# Patient Record
Sex: FEMALE | Race: OTHER
[De-identification: names, ages, dates, MRNs, and addresses within clinical notes are randomized per-mention and may not be internally consistent; named-entity substitution may affect disease eponyms.]

---

## 2018-10-08 ENCOUNTER — HOSPITAL ENCOUNTER (EMERGENCY)
Dept: HOSPITAL 72 - EMR | Age: 23
Discharge: HOME | End: 2018-10-08
Payer: MEDICAID

## 2018-10-08 VITALS — DIASTOLIC BLOOD PRESSURE: 94 MMHG | SYSTOLIC BLOOD PRESSURE: 154 MMHG

## 2018-10-08 VITALS — SYSTOLIC BLOOD PRESSURE: 110 MMHG | DIASTOLIC BLOOD PRESSURE: 65 MMHG

## 2018-10-08 VITALS — BODY MASS INDEX: 24.45 KG/M2 | WEIGHT: 138 LBS | HEIGHT: 63 IN

## 2018-10-08 VITALS — SYSTOLIC BLOOD PRESSURE: 121 MMHG | DIASTOLIC BLOOD PRESSURE: 80 MMHG

## 2018-10-08 DIAGNOSIS — H81.10: Primary | ICD-10-CM

## 2018-10-08 LAB
ADD MANUAL DIFF: NO
ALBUMIN SERPL-MCNC: 4.2 G/DL (ref 3.4–5)
ALBUMIN/GLOB SERPL: 1 {RATIO} (ref 1–2.7)
ALP SERPL-CCNC: 69 U/L (ref 46–116)
ALT SERPL-CCNC: 27 U/L (ref 12–78)
APPEARANCE UR: CLEAR
APTT PPP: (no result) S
AST SERPL-CCNC: 15 U/L (ref 15–37)
BASOPHILS NFR BLD AUTO: 1.2 % (ref 0–2)
BILIRUB SERPL-MCNC: 0.2 MG/DL (ref 0.2–1)
BUN SERPL-MCNC: 10 MG/DL (ref 7–18)
CALCIUM SERPL-MCNC: 9.5 MG/DL (ref 8.5–10.1)
CHLORIDE SERPL-SCNC: 104 MMOL/L (ref 98–107)
CO2 SERPL-SCNC: 27 MMOL/L (ref 21–32)
CREAT SERPL-MCNC: 0.8 MG/DL (ref 0.55–1.3)
EOSINOPHIL NFR BLD AUTO: 1 % (ref 0–3)
ERYTHROCYTE [DISTWIDTH] IN BLOOD BY AUTOMATED COUNT: 14.7 % (ref 11.6–14.8)
GLOBULIN SER-MCNC: 4 G/DL
GLUCOSE UR STRIP-MCNC: NEGATIVE MG/DL
HCT VFR BLD CALC: 39.9 % (ref 37–47)
HGB BLD-MCNC: 13.3 G/DL (ref 12–16)
KETONES UR QL STRIP: NEGATIVE
LEUKOCYTE ESTERASE UR QL STRIP: NEGATIVE
LYMPHOCYTES NFR BLD AUTO: 29.4 % (ref 20–45)
MCV RBC AUTO: 81 FL (ref 80–99)
MONOCYTES NFR BLD AUTO: 6.1 % (ref 1–10)
NEUTROPHILS NFR BLD AUTO: 62.5 % (ref 45–75)
NITRITE UR QL STRIP: NEGATIVE
PH UR STRIP: 6.5 [PH] (ref 4.5–8)
PLATELET # BLD: 269 K/UL (ref 150–450)
POTASSIUM SERPL-SCNC: 3.7 MMOL/L (ref 3.5–5.1)
PROT UR QL STRIP: NEGATIVE
RBC # BLD AUTO: 4.93 M/UL (ref 4.2–5.4)
SODIUM SERPL-SCNC: 140 MMOL/L (ref 136–145)
SP GR UR STRIP: 1.01 (ref 1–1.03)
UROBILINOGEN UR-MCNC: NORMAL MG/DL (ref 0–1)
WBC # BLD AUTO: 11.1 K/UL (ref 4.8–10.8)

## 2018-10-08 PROCEDURE — 80053 COMPREHEN METABOLIC PANEL: CPT

## 2018-10-08 PROCEDURE — 81025 URINE PREGNANCY TEST: CPT

## 2018-10-08 PROCEDURE — 85025 COMPLETE CBC W/AUTO DIFF WBC: CPT

## 2018-10-08 PROCEDURE — 99284 EMERGENCY DEPT VISIT MOD MDM: CPT

## 2018-10-08 PROCEDURE — 81003 URINALYSIS AUTO W/O SCOPE: CPT

## 2018-10-08 PROCEDURE — 36415 COLL VENOUS BLD VENIPUNCTURE: CPT

## 2018-10-08 PROCEDURE — 93005 ELECTROCARDIOGRAM TRACING: CPT

## 2018-10-08 PROCEDURE — 96361 HYDRATE IV INFUSION ADD-ON: CPT

## 2018-10-08 PROCEDURE — 80307 DRUG TEST PRSMV CHEM ANLYZR: CPT

## 2018-10-08 PROCEDURE — 96374 THER/PROPH/DIAG INJ IV PUSH: CPT

## 2018-10-09 NOTE — EMERGENCY ROOM REPORT
History of Present Illness


General


Chief Complaint:  Dizziness


Source:  Patient





Present Illness


HPI


23-year-old female presents ED for evaluation.  Complaining of dizziness which 

started tonight.  Described as room spinning sensation, worse with sudden head 

movement.  Denies headache.  Denies nausea or vomiting.  Patient states that 

there is a family history of vertigo.  Denies alcohol or drug use.  No other 

aggravating or relieving factors.  Denies any other associated symptoms


Allergies:  


Coded Allergies:  


     No Known Allergies (Unverified , 10/8/18)





Patient History


Past Medical History:  none


Past Surgical History:  none


Pertinent Family History:  none


Social History:  Denies: smoking, alcohol use, drug use


Last Menstrual Period:  Sept 17, 2018


Pregnant Now:  No


Immunizations:  UTD


Reviewed Nursing Documentation:  PMH: Agreed; PSxH: Agreed





Nursing Documentation-PMH


Past Medical History:  No History, Except For





Review of Systems


All Other Systems:  negative except mentioned in HPI





Physical Exam





Vital Signs








  Date Time  Temp Pulse Resp B/P (MAP) Pulse Ox O2 Delivery O2 Flow Rate FiO2


 


10/8/18 21:08 98.4 99 16 154/94 100 Room Air  





 98.4       


 


10/8/18 23:26        99








Sp02 EP Interpretation:  reviewed, normal


General Appearance:  no apparent distress, alert, GCS 15, non-toxic


Head:  normocephalic, atraumatic


Eyes:  bilateral eye normal inspection, bilateral eye PERRL


ENT:  hearing grossly normal, normal pharynx, no angioedema, normal voice


Neck:  full range of motion, supple/symm/no masses


Respiratory:  chest non-tender, lungs clear, normal breath sounds, speaking 

full sentences


Cardiovascular #1:  regular rate, rhythm, no edema


Cardiovascular #2:  2+ carotid (R), 2+ carotid (L), 2+ radial (R), 2+ radial (L)

, 2+ dorsalis pedis (R), 2+ dorsalis pedis (L)


Gastrointestinal:  normal bowel sounds, non tender, soft, non-distended, no 

guarding, no rebound


Rectal:  deferred


Genitourinary:  normal inspection, no CVA tenderness


Musculoskeletal:  back normal, gait/station normal, normal range of motion, non-

tender


Neurologic:  alert, oriented x3, responsive, motor strength/tone normal, 

sensory intact, speech normal


Psychiatric:  judgement/insight normal, memory normal, mood/affect normal, no 

suicidal/homicidal ideation


Reflexes:  3+ bicep (R), 3+ bicep (L), 3+ tricep (R), 3+ tricep (L), 3+ knee (R)

, 3+ knee (L)


Skin:  normal color, no rash, warm/dry, well hydrated


Lymphatic:  no adenopathy





Medical Decision Making


Diagnostic Impression:  


 Primary Impression:  


 Benign positional vertigo


 Qualified Codes:  H81.10 - Benign paroxysmal vertigo, unspecified ear


ER Course


Hospital Course 


23-year-old female presents ED complaining of room spinning sensation 





Differential diagnoses include: arrythmia, dehydration, vertigo, UTI, substance 

abuse 





Clinical course


Patient placed on stretcher. on cardiac monitor. After initial history and 

physical I ordered labs, EKG, IVFs, meclizine, reglan 





labs reviewed- no leukocytosis, hemoglobin/hematocrit stable, electrolytes okay

, UA negative, UDS negative


EKG - sinus tachycardia, no acute ischemic changes interpreted by me 





Upon reassessment patient states his symptoms have improved.  Clinical findings 

consistent with vertigo.  Discussed findings with patient, family.  Safe for 

discharge with close outpatient follow-up





I.  I feel this is a highly complex case requiring extensive working including 

EKG/Rhythm strip, Xray/CT/US, Blood/urine lab work, repeat exams while in ED, 

and administration of strong opiates/narcotics for pain control, admission to 

hospital or close patient follow up.  





Diagnosis - benign positional vertigo 





stable and discharged to home with prescription for meclizine, reglan.  

Followup with PMD.  Return to ED if symptoms recur or worsen





Labs








Test


  10/8/18


22:15


 


White Blood Count


  11.1 K/UL


(4.8-10.8)


 


Red Blood Count


  4.93 M/UL


(4.20-5.40)


 


Hemoglobin


  13.3 G/DL


(12.0-16.0)


 


Hematocrit


  39.9 %


(37.0-47.0)


 


Mean Corpuscular Volume 81 FL (80-99) 


 


Mean Corpuscular Hemoglobin


  26.9 PG


(27.0-31.0)


 


Mean Corpuscular Hemoglobin


Concent 33.2 G/DL


(32.0-36.0)


 


Red Cell Distribution Width


  14.7 %


(11.6-14.8)


 


Platelet Count


  269 K/UL


(150-450)


 


Mean Platelet Volume


  9.4 FL


(6.5-10.1)


 


Neutrophils (%) (Auto)


  62.5 %


(45.0-75.0)


 


Lymphocytes (%) (Auto)


  29.4 %


(20.0-45.0)


 


Monocytes (%) (Auto)


  6.1 %


(1.0-10.0)


 


Eosinophils (%) (Auto)


  1.0 %


(0.0-3.0)


 


Basophils (%) (Auto)


  1.2 %


(0.0-2.0)


 


Urine Color Pale yellow 


 


Urine Appearance Clear 


 


Urine pH 6.5 (4.5-8.0) 


 


Urine Specific Gravity


  1.010


(1.005-1.035)


 


Urine Protein


  Negative


(NEGATIVE)


 


Urine Glucose (UA)


  Negative


(NEGATIVE)


 


Urine Ketones


  Negative


(NEGATIVE)


 


Urine Blood


  Negative


(NEGATIVE)


 


Urine Nitrite


  Negative


(NEGATIVE)


 


Urine Bilirubin


  Negative


(NEGATIVE)


 


Urine Urobilinogen


  Normal MG/DL


(0.0-1.0)


 


Urine Leukocyte Esterase


  Negative


(NEGATIVE)


 


Urine HCG, Qualitative


  Negative


(NEGATIVE)


 


Sodium Level


  140 MMOL/L


(136-145)


 


Potassium Level


  3.7 MMOL/L


(3.5-5.1)


 


Chloride Level


  104 MMOL/L


()


 


Carbon Dioxide Level


  27 MMOL/L


(21-32)


 


Blood Urea Nitrogen


  10 mg/dL


(7-18)


 


Creatinine


  0.8 MG/DL


(0.55-1.30)


 


Estimat Glomerular Filtration


Rate > 60 mL/min


(>60)


 


Glucose Level


  116 MG/DL


()


 


Calcium Level


  9.5 MG/DL


(8.5-10.1)


 


Total Bilirubin


  0.2 MG/DL


(0.2-1.0)


 


Aspartate Amino Transf


(AST/SGOT) 15 U/L (15-37) 


 


 


Alanine Aminotransferase


(ALT/SGPT) 27 U/L (12-78) 


 


 


Alkaline Phosphatase


  69 U/L


()


 


Total Protein


  8.2 G/DL


(6.4-8.2)


 


Albumin


  4.2 G/DL


(3.4-5.0)


 


Globulin 4.0 g/dL 


 


Albumin/Globulin Ratio 1.0 (1.0-2.7) 


 


Urine Opiates Screen


  Negative


(NEGATIVE)


 


Urine Barbiturates Screen


  Negative


(NEGATIVE)


 


Phencyclidine (PCP) Screen


  Negative


(NEGATIVE)


 


Urine Amphetamines Screen


  Negative


(NEGATIVE)


 


Urine Benzodiazepines Screen


  Negative


(NEGATIVE)


 


Urine Cocaine Screen


  Negative


(NEGATIVE)


 


Urine Marijuana (THC) Screen


  Negative


(NEGATIVE)








EKG Diagnostic Results


Rate:  tachycardiac


Rhythm:  NSR


ST Segments:  no acute changes


ASA given to the pt in ED:  No





Rhythm Strip Diag. Results


EP Interpretation:  yes


Rhythm:  NSR, no PVC's, no ectopy





Last Vital Signs








  Date Time  Temp Pulse Resp B/P (MAP) Pulse Ox O2 Delivery O2 Flow Rate FiO2


 


10/8/18 23:26 97.8 89 16 121/80 99 Room Air  99








Status:  improved


Disposition:  HOME, SELF-CARE


Condition:  Stable


Scripts


Metoclopramide Hcl* (REGLAN*) 10 Mg Tablet


10 MG ORAL THREE TIMES A DAY, #20 TAB


   Prov: Lalo Sharp MD         10/8/18 


Meclizine Hcl* (MECLIZINE*) 25 Mg Tablet


25 MG ORAL THREE TIMES A DAY, #20 TAB


   Prov: Lalo Sharp MD         10/8/18


Patient Instructions:  Benign Positional Vertigo











Lalo Sharp MD Oct 9, 2018 00:08

## 2018-10-09 NOTE — CARDIOLOGY REPORT
--------------- APPROVED REPORT --------------





EKG Measurement

Heart [id number]

SD [id number]

[id number]

[id number]

[id number]





Sinus tachycardia

Possible Left atrial enlargement

Borderline ECG

## 2019-09-26 ENCOUNTER — HOSPITAL ENCOUNTER (EMERGENCY)
Dept: HOSPITAL 72 - EMR | Age: 24
Discharge: HOME | End: 2019-09-26
Payer: MEDICAID

## 2019-09-26 VITALS — SYSTOLIC BLOOD PRESSURE: 137 MMHG | DIASTOLIC BLOOD PRESSURE: 91 MMHG

## 2019-09-26 VITALS — HEIGHT: 63 IN | WEIGHT: 155 LBS | BODY MASS INDEX: 27.46 KG/M2

## 2019-09-26 VITALS — DIASTOLIC BLOOD PRESSURE: 87 MMHG | SYSTOLIC BLOOD PRESSURE: 131 MMHG

## 2019-09-26 VITALS — DIASTOLIC BLOOD PRESSURE: 82 MMHG | SYSTOLIC BLOOD PRESSURE: 136 MMHG

## 2019-09-26 DIAGNOSIS — F41.9: Primary | ICD-10-CM

## 2019-09-26 DIAGNOSIS — R00.2: ICD-10-CM

## 2019-09-26 DIAGNOSIS — K21.9: ICD-10-CM

## 2019-09-26 DIAGNOSIS — N39.0: ICD-10-CM

## 2019-09-26 LAB
ADD MANUAL DIFF: NO
ALBUMIN SERPL-MCNC: 4.1 G/DL (ref 3.4–5)
ALBUMIN/GLOB SERPL: 1.1 {RATIO} (ref 1–2.7)
ALP SERPL-CCNC: 60 U/L (ref 46–116)
ALT SERPL-CCNC: 25 U/L (ref 12–78)
ANION GAP SERPL CALC-SCNC: 12 MMOL/L (ref 5–15)
APPEARANCE UR: CLEAR
APTT PPP: (no result) S
AST SERPL-CCNC: 20 U/L (ref 15–37)
BASOPHILS NFR BLD AUTO: 1.3 % (ref 0–2)
BILIRUB SERPL-MCNC: 0.3 MG/DL (ref 0.2–1)
BUN SERPL-MCNC: 10 MG/DL (ref 7–18)
CALCIUM SERPL-MCNC: 9.3 MG/DL (ref 8.5–10.1)
CHLORIDE SERPL-SCNC: 105 MMOL/L (ref 98–107)
CO2 SERPL-SCNC: 25 MMOL/L (ref 21–32)
CREAT SERPL-MCNC: 0.9 MG/DL (ref 0.55–1.3)
EOSINOPHIL NFR BLD AUTO: 0.8 % (ref 0–3)
ERYTHROCYTE [DISTWIDTH] IN BLOOD BY AUTOMATED COUNT: 13.5 % (ref 11.6–14.8)
GLOBULIN SER-MCNC: 3.8 G/DL
GLUCOSE UR STRIP-MCNC: NEGATIVE MG/DL
HCT VFR BLD CALC: 40.1 % (ref 37–47)
HGB BLD-MCNC: 13.1 G/DL (ref 12–16)
KETONES UR QL STRIP: (no result)
LEUKOCYTE ESTERASE UR QL STRIP: (no result)
LYMPHOCYTES NFR BLD AUTO: 27.8 % (ref 20–45)
MCV RBC AUTO: 82 FL (ref 80–99)
MONOCYTES NFR BLD AUTO: 6.2 % (ref 1–10)
NEUTROPHILS NFR BLD AUTO: 63.8 % (ref 45–75)
NITRITE UR QL STRIP: NEGATIVE
PH UR STRIP: 7 [PH] (ref 4.5–8)
PLATELET # BLD: 261 K/UL (ref 150–450)
POTASSIUM SERPL-SCNC: 3.7 MMOL/L (ref 3.5–5.1)
PROT UR QL STRIP: NEGATIVE
RBC # BLD AUTO: 4.89 M/UL (ref 4.2–5.4)
SODIUM SERPL-SCNC: 142 MMOL/L (ref 136–145)
SP GR UR STRIP: 1.01 (ref 1–1.03)
UROBILINOGEN UR-MCNC: NORMAL MG/DL (ref 0–1)
WBC # BLD AUTO: 9.8 K/UL (ref 4.8–10.8)

## 2019-09-26 PROCEDURE — 96374 THER/PROPH/DIAG INJ IV PUSH: CPT

## 2019-09-26 PROCEDURE — 99284 EMERGENCY DEPT VISIT MOD MDM: CPT

## 2019-09-26 PROCEDURE — 84484 ASSAY OF TROPONIN QUANT: CPT

## 2019-09-26 PROCEDURE — 81025 URINE PREGNANCY TEST: CPT

## 2019-09-26 PROCEDURE — 81001 URINALYSIS AUTO W/SCOPE: CPT

## 2019-09-26 PROCEDURE — 36415 COLL VENOUS BLD VENIPUNCTURE: CPT

## 2019-09-26 PROCEDURE — 80329 ANALGESICS NON-OPIOID 1 OR 2: CPT

## 2019-09-26 PROCEDURE — 71045 X-RAY EXAM CHEST 1 VIEW: CPT

## 2019-09-26 PROCEDURE — 93005 ELECTROCARDIOGRAM TRACING: CPT

## 2019-09-26 PROCEDURE — 96375 TX/PRO/DX INJ NEW DRUG ADDON: CPT

## 2019-09-26 PROCEDURE — 85025 COMPLETE CBC W/AUTO DIFF WBC: CPT

## 2019-09-26 PROCEDURE — 80053 COMPREHEN METABOLIC PANEL: CPT

## 2019-09-26 PROCEDURE — 80307 DRUG TEST PRSMV CHEM ANLYZR: CPT

## 2019-09-26 PROCEDURE — 96361 HYDRATE IV INFUSION ADD-ON: CPT

## 2019-09-26 NOTE — NUR
ED Nurse Note:



PT WALKED IN TO ER TODAY FROM HOME. AOX4. PT C/O PALPITATIONS AND FAST HEART 
RATE X 3 DAYS AGO. PT DENIES ANY MEDICAL HISTORY, DRUG OR ALCOHOL USE. PT 
STATES SHE HAS NOT DONE ANYTHING OUT OF THE ORDINARY AND DENIES ANY ANXIETY. PT 
DENIES CHEST PAIN OR SOB. AT BEDSIDE, PT IS CALM, COOPERATIVE, AND ANSWERING 
QUESTIONS APPROPRIATELY.  WITH /91. SINUS TACHYCARDIA ON MONITOR. 
DR LAI AWARE.

## 2019-09-26 NOTE — EMERGENCY ROOM REPORT
History of Present Illness


General


Chief Complaint:  Palpitations


Source:  Patient





Present Illness


HPI


24-year-old female with history of anxiety here complaining of 3 days of 

intermittent palpitations.  Patient reports that she was standing in line to 

forward her pain that her palpitations started.  However reports that she is 

very active denies calf swelling and tenderness.  Denies chest pain shortness 

of breath.  Denies abdominal pain nausea vomiting.  Reports that her 

palpitations get worse as she lays down and feels acidity coming up her throat.

  Patient reports that this past weekend she consumes a lot of spicy and acidic 

food as well as more than usual number of alcoholic beverages.  Denies smoking 

tobacco and drug use.  Denies dizziness and headache, vision changes, reports 

that she is beginning to get her usual migraine headache which Excedrin is 

helping.  Patient reports that she consumes 1 cup of coffee every day.  Denies 

urinary symptoms, hematuria, constipation diarrhea, blood in her stool.  Denies 

recent URI symptoms.  Patient reports that she recently traveled to [place] 

however denies sick contact reports that she has history of anxiety however 

denies any medication intake in the past.  Denies suicidal and homicidal 

ideation.  Reports that her sleep and appetite is good.  Denies visual and 

auditory hallucinations, mood swings, impulsivity, grandiosity, and other 

associated symptoms.LMP 3 wks ago, regular, denying sexual activity.


Allergies:  


Coded Allergies:  


     No Known Allergies (Unverified , 10/8/18)





Patient History


Past Medical History:  see triage record


Past Surgical History:  unable to obtain


Pertinent Family History:  none


Last Menstrual Period:  09/06/2019


Pregnant Now:  No


Immunizations:  UTD


Reviewed Nursing Documentation:  PMH: Agreed; PSxH: Agreed





Nursing Documentation-PM


Past Medical History:  No Stated History





Review of Systems


All Other Systems:  negative except mentioned in HPI





Physical Exam





Vital Signs








  Date Time  Temp Pulse Resp B/P (MAP) Pulse Ox O2 Delivery O2 Flow Rate FiO2


 


9/26/19 17:59 98.8 110 18 159/91 (113) 99 Room Air  








Sp02 EP Interpretation:  reviewed, normal


General Appearance:  no apparent distress, alert, GCS 15, non-toxic


Head:  normocephalic, atraumatic


Eyes:  bilateral eye normal inspection, bilateral eye PERRL


ENT:  hearing grossly normal, normal pharynx, no angioedema, normal voice


Neck:  full range of motion, supple/symm/no masses


Respiratory:  chest non-tender, lungs clear, normal breath sounds, no rhonchi, 

no accessory muscle use, no wheezing, speaking full sentences


Cardiovascular #1:  regular rate, rhythm, no edema, no murmur, normal capillary 

refill


Cardiovascular #2:  2+ carotid (R), 2+ carotid (L), 2+ radial (R), 2+ radial (L)

, 2+ dorsalis pedis (R), 2+ dorsalis pedis (L)


Gastrointestinal:  normal bowel sounds, non tender, soft, non-distended, no 

guarding, no rebound


Rectal:  deferred


Genitourinary:  normal inspection, no CVA tenderness


Musculoskeletal:  back normal, gait/station normal, normal range of motion, non-

tender, no calf tenderness


Neurologic:  alert, oriented x3, responsive, motor strength/tone normal, 

sensory intact, speech normal


Psychiatric:  judgement/insight normal, memory normal, mood/affect normal, no 

suicidal/homicidal ideation


Skin:  no rash


Lymphatic:  normal inspection, no adenopathy





Medical Decision Making


PA Attestation


All diagnoses and treatment plans were reviewed and discussed with my 

supervising physician Dr. Newman


Diagnostic Impression:  


 Primary Impression:  


 Palpitations


 Additional Impressions:  


 GERD (gastroesophageal reflux disease)


 Anxiety, generalized


 UTI (urinary tract infection)


ER Course


24-year-old female with history of anxiety here complaining of 3 days of 

intermittent palpitations.  Patient reports that she was standing in line to 

forward her pain that her palpitations started.  However reports that she is 

very active denies calf swelling and tenderness.  Denies chest pain shortness 

of breath.  Denies abdominal pain nausea vomiting.  Reports that her 

palpitations get worse as she lays down and feels acidity coming up her throat.

  Patient reports that this past weekend she consumes a lot of spicy and acidic 

food as well as more than usual number of alcoholic beverages.  Denies smoking 

tobacco and drug use.  Denies dizziness and headache, vision changes, reports 

that she is beginning to get her usual migraine headache which Excedrin is 

helping.  Patient reports that she consumes 1 cup of coffee every day.  Denies 

urinary symptoms, hematuria, constipation diarrhea, blood in her stool.  Denies 

recent URI symptoms.  Patient reports that she recently traveled to [place] 

however denies sick contact reports that she has history of anxiety however 

denies any medication intake in the past.  Denies suicidal and homicidal 

ideation.  Reports that her sleep and appetite is good.  Denies visual and 

auditory hallucinations, mood swings, impulsivity, grandiosity, and other 

associated symptoms.LMP 3 wks ago, regular, denying sexual activity. 





Ddx considered but are not limited to: generalized anxiety disorder, panic 

attack, depression with psychotic feature, bipolar disorder, drug overdose, GERD

, gastritis, palpitations unspecified, incidental UTI














Vital signs: are WNL, pt. is afebrile








 H&PE are most consistent with: GERD, anxiety, palpitation, incidental UTI














ORDERS: chest pain work up, omeprazole, Macrobid











ED INTERVENTIONS: NS bolus, pepcid, zofran




















DISCHARGE: At this time pt. is stable for d/c to home. Will provide printed 

patient care instructions, and any necessary prescriptions. Care plan and 

follow up instructions have been discussed with the patient prior to discharge.

  Advised the patient to follow-up with her primary care provider for 

management of palpitations and anxiety also follow-up with cardiologist for 

possible Echo, possible valve abnormality also if worsening symptoms return to 

the emergency room avoid eating spicy and acidic food


EKG Diagnostic Results


Rate:  tachycardiac


ST Segments:  no acute changes


Other Impression


No acute ST changes





Chest X-Ray Diagnostic Results


Chest X-Ray Diagnostic Results :  


   Chest X-Ray Ordered:  Yes


   # of Views/Limited/Complete:  1 View


   Indication:  Other


   EP Interpretation:  Yes


   PA Xray:  Interpretation reviewed, by supervising MD, and agrees with 

findings.


   Interpretation:  no consolidation, no effusion, no pneumothorax


   Impression:  No acute disease


   Electronically Signed by:  Raffy Mitchell PA-C





Last Vital Signs








  Date Time  Temp Pulse Resp B/P (MAP) Pulse Ox O2 Delivery O2 Flow Rate FiO2


 


9/26/19 19:01 98.4 96 17 131/87 100 Room Air  








Disposition:  HOME, SELF-CARE


Condition:  Stable


Scripts


Nitrofurantoin Monohyd/M-Cryst* (MACROBID 100 MG*) 100 Mg Capsule


100 MG ORAL EVERY 12 HOURS for 7 Days, #14 CAP


   Prov: Raffy Cummings         9/26/19 


Omeprazole (OMEPRAZOLE) 20 Mg Tablet.


20 MG ORAL DAILY, #30 TAB


   Prov: Raffy Cummings         9/26/19


Referrals:  


[facility],REFER (PCP)


Patient Instructions:  Food Choices for Gastroesophageal Reflux Disease, Adult, 

Generalized Anxiety Disorder, Urinary Tract Infection, Easy-to-Read





Additional Instructions:  


Take your medication as directed avoid spicy and acidic food follow-up with 

your primary care provider regarding her anxiety and palpitations if symptoms 

worsen return to the emergency room increase oral hydration.











Raffy Cummings Sep 26, 2019 19:07

## 2019-09-29 NOTE — CARDIOLOGY REPORT
--------------- APPROVED REPORT --------------





EKG Measurement

Heart [id number]

SC [id number]

[id number]

[id number]

[id number]





Sinus tachycardia

Possible Left atrial enlargement

Borderline ECG